# Patient Record
Sex: MALE | Race: WHITE | URBAN - METROPOLITAN AREA
[De-identification: names, ages, dates, MRNs, and addresses within clinical notes are randomized per-mention and may not be internally consistent; named-entity substitution may affect disease eponyms.]

---

## 2024-11-01 ENCOUNTER — APPOINTMENT (OUTPATIENT)
Dept: GENERAL RADIOLOGY | Facility: CLINIC | Age: 24
End: 2024-11-01

## 2024-11-01 ENCOUNTER — HOSPITAL ENCOUNTER (EMERGENCY)
Facility: CLINIC | Age: 24
Discharge: HOME | End: 2024-11-01
Attending: SPECIALIST

## 2024-11-01 VITALS
RESPIRATION RATE: 15 BRPM | DIASTOLIC BLOOD PRESSURE: 57 MMHG | HEART RATE: 74 BPM | OXYGEN SATURATION: 99 % | WEIGHT: 130 LBS | HEIGHT: 63 IN | SYSTOLIC BLOOD PRESSURE: 104 MMHG | TEMPERATURE: 98.6 F | BODY MASS INDEX: 23.04 KG/M2

## 2024-11-01 DIAGNOSIS — J21.9 BRONCHIOLITIS: ICD-10-CM

## 2024-11-01 DIAGNOSIS — R11.2 NAUSEA VOMITING AND DIARRHEA: Primary | ICD-10-CM

## 2024-11-01 DIAGNOSIS — R19.7 NAUSEA VOMITING AND DIARRHEA: Primary | ICD-10-CM

## 2024-11-01 LAB
FLUAV AG SPEC QL: NEGATIVE
FLUBV AG SPEC QL: NEGATIVE
SARS-COV-2 RDRP RESP QL NAA+PROBE: NOT DETECTED
SPECIMEN DESCRIPTION: NORMAL
SPECIMEN SOURCE: NORMAL
STREP A, MOLECULAR: NEGATIVE

## 2024-11-01 PROCEDURE — 99284 EMERGENCY DEPT VISIT MOD MDM: CPT

## 2024-11-01 PROCEDURE — 87651 STREP A DNA AMP PROBE: CPT

## 2024-11-01 PROCEDURE — 71045 X-RAY EXAM CHEST 1 VIEW: CPT

## 2024-11-01 PROCEDURE — 87804 INFLUENZA ASSAY W/OPTIC: CPT

## 2024-11-01 PROCEDURE — 87635 SARS-COV-2 COVID-19 AMP PRB: CPT

## 2024-11-01 RX ORDER — ONDANSETRON 4 MG/1
4 TABLET, ORALLY DISINTEGRATING ORAL 3 TIMES DAILY PRN
Qty: 21 TABLET | Refills: 0 | Status: SHIPPED | OUTPATIENT
Start: 2024-11-01

## 2024-11-01 ASSESSMENT — ENCOUNTER SYMPTOMS
COUGH: 1
NAUSEA: 1
BACK PAIN: 0
VOMITING: 1
BLOOD IN STOOL: 0
SORE THROAT: 1
ABDOMINAL PAIN: 0
WHEEZING: 0
DIARRHEA: 1
SHORTNESS OF BREATH: 0

## 2024-11-01 NOTE — DISCHARGE INSTR - COC
Continuity of Care Form    Patient Name: Behzad Wasserman   :  2000  MRN:  [id number]    Admit date:  2024  Discharge date:  ***    Code Status Order: No Order   Advance Directives:   Advance Care Flowsheet Documentation             Admitting Physician:  No admitting provider for patient encounter.  PCP: No primary care provider on file.    Discharging Nurse: ***  Discharging Hospital Unit/Room#: ER12/ER12  Discharging Unit Phone Number: ***    Emergency Contact:   Extended Emergency Contact Information  Primary Emergency Contact: kel flores Phone: [phone]  Relation: Spouse  Preferred language: English   needed? No    Past Surgical History:  Past Surgical History:   Procedure Laterality Date    KNEE SURGERY         Immunization History:     There is no immunization history on file for this patient.    Active Problems:  There is no problem list on file for this patient.      Isolation/Infection:   Isolation            No Isolation          Patient Infection Status       None to display                     Nurse Assessment:  Last Vital Signs: BP (!) 104/57   Pulse 74   Temp 98.6 °F (37 °C) (Oral)   Resp 15   Ht 1.6 m (5' 3\")   Wt 59 kg (130 lb)   SpO2 99%   BMI 23.03 kg/m²     Last documented pain score (0-10 scale):    Last Weight:   Wt Readings from Last 1 Encounters:   24 59 kg (130 lb)     Mental Status:  {IP PT MENTAL STATUS:}    IV Access:  { GIO IV ACCESS:213788233}    Nursing Mobility/ADLs:  Walking   {CHP DME ADLs:544514602}  Transfer  {CHP DME ADLs:956693099}  Bathing  {CHP DME ADLs:111597311}  Dressing  {CHP DME ADLs:355254058}  Toileting  {CHP DME ADLs:189984529}  Feeding  {CHP DME ADLs:769277602}  Med Admin  {CHP DME ADLs:312633664}  Med Delivery   { GIO MED Delivery:636291786}    Wound Care Documentation and Therapy:        Elimination:  Continence:   Bowel: {YES / NO:}  Bladder: {YES / NO:}  Urinary Catheter: {Urinary  transfer of Behzad Wasserman  is necessary for the continuing treatment of the diagnosis listed and that he requires {Admit to Appropriate Level of Care:49193} for {GREATER/LESS:101436650} 30 days.     Update Admission H&P: {CHP DME Changes in HandP:839275440}    PHYSICIAN SIGNATURE:  {Esignature:204221199}

## 2024-11-01 NOTE — ED NOTES
Pt presents to the ED via private auto accompanied by his girlfriend. Pt states he has been experiencing nausea and vomiting x 1 week. Pt states he is able to eat and keep it down however has random vomiting. Pt also c/o fatigue for the same time frame

## 2024-11-01 NOTE — ED PROVIDER NOTES
[facility]  [address] [facility] [address]  Phone: [phone]      Pt Name: Behzad Wasserman  MRN: [id number]  Birthdate 2000  Date of evaluation: 11/1/2024      CHIEF COMPLAINT       Chief Complaint   Patient presents with    Vomiting     X1 week intermittently    Fatigue     X1 week         HISTORY OF PRESENT ILLNESS    Behzad Wasserman is a 24 y.o. male who presents   Chief Complaint   Patient presents with    Vomiting     X1 week intermittently    Fatigue     X1 week   .    4-year-old male patient presents to the emergency department for evaluation of nausea, vomiting, diarrhea, generalized weakness and cough with whitish yellow-colored sputum.  Patient started having nausea about 6 days ago, vomiting twice yesterday and intermittent diarrhea about 2-3 times a day for last 3 days.  He denies any hematemesis, melena or hematochezia and denies any fever or chills.  He denies any abdominal pain.  He has been having generalized weakness and fatigue for last 1 week.  He also complains of cough with whitish yellow-colored sputum and is concerned about pneumonia as he is a smoker.  He has taken Zofran ODT with some relief of the nausea and Tylenol for the headache.  He also has taken baby aspirin.  He admits to having sore throat but denies any difficulty swallowing or difficulty in breathing.  He denies any sick or ill contacts or recent travels.  There are no exacerbating or relieving factors.    REVIEW OF SYSTEMS     Review of Systems   Constitutional:  Positive for fatigue. Negative for chills and fever.   HENT:  Positive for congestion and sore throat.    Respiratory:  Positive for cough. Negative for shortness of breath and wheezing.    Cardiovascular:  Negative for chest pain and palpitations.   Gastrointestinal:  Positive for diarrhea, nausea and vomiting. Negative for abdominal pain and blood in stool.   Genitourinary:  Negative for dysuria and frequency.   Musculoskeletal:  Negative for back pain  Breath Reason for Exam: SOB FINDINGS: Cardiac and mediastinal structures appear within normal limits for size. Mildly prominent peribronchial change seen in the right lung between the 8th and 9th posterior rib which may represent focal bronchiolitis.  Lungs are otherwise clear.  No free air beneath the diaphragms.     Findings suspicious for focal bronchiolitis in the right lung.          LABS:  Labs Reviewed   RAPID STREP SCREEN   COVID-19, RAPID   RAPID INFLUENZA A/B ANTIGENS       Rapid strep screen, rapid COVID swab and rapid influenza antigens are all negative    EMERGENCY DEPARTMENT COURSE:   Vitals:    Vitals:    11/01/24 0128   BP: (!) 104/57   Pulse: 74   Resp: 15   Temp: 98.6 °F (37 °C)   TempSrc: Oral   SpO2: 99%   Weight: 59 kg (130 lb)   Height: 1.6 m (5' 3\")     -------------------------  BP: (!) 104/57, Temp: 98.6 °F (37 °C), Pulse: 74, Respirations: 15    Orders Placed This Encounter   Medications    ondansetron (ZOFRAN-ODT) 4 MG disintegrating tablet     Sig: Take 1 tablet by mouth 3 times daily as needed for Nausea or Vomiting     Dispense:  21 tablet     Refill:  0       During the emergency department course, patient is resting comfortably and does not appear to be in any pain or any distress.  He did not have any episode of vomiting.  He is afebrile and pulse oximetry is 98% on room air.  He appears to have a viral illness.  Plan is to discharge the patient with instructions drink plenty of oral fluids, Zofran ODT as needed for the nausea or vomiting, follow-up with PCP, return anytime for worsening symptoms or any new symptoms.  Patient is advised to call us if any questions, concerns or problems    I have reviewed the disposition diagnosis with the patient and or their family/guardian. I have answered their questions and given discharge instructions. They voiced understanding of these instructions and did not have any further questions or complaints.    Re-evaluation Notes    Patient is

## 2024-11-01 NOTE — DISCHARGE INSTRUCTIONS
Please understand that at this time there is no evidence for a more serious underlying process, but that early in the process of an illness or injury, an emergency department workup can be falsely reassuring.  You should contact your family doctor within the next 48 hours for a follow up appointment    THANK YOU!!!    From [facility] and [facility]    On behalf of the Emergency Department staff at [facility], I would like to thank you for giving us the opportunity to address your health care needs and concerns.    We hope that during your visit, our service was delivered in a professional and caring manner. Please keep [facility] in mind as we walk with you down the path to your own personal wellness.     Please expect an automated text message or email from us so we can ask a few questions about your health and progress. Based on your answers, a clinician may call you back to offer help and instructions.    Please understand that early in the process of an illness or injury, an emergency department workup can be falsely reassuring.  If you notice any worsening, changing or persistent symptoms please call your family doctor or return to the ER immediately.     Tell us how we did during your visit at [email or web address]   and let us know about your experience